# Patient Record
Sex: FEMALE | Race: WHITE | URBAN - METROPOLITAN AREA
[De-identification: names, ages, dates, MRNs, and addresses within clinical notes are randomized per-mention and may not be internally consistent; named-entity substitution may affect disease eponyms.]

---

## 2022-03-31 ENCOUNTER — APPOINTMENT (RX ONLY)
Dept: URBAN - METROPOLITAN AREA CLINIC 49 | Facility: CLINIC | Age: 62
Setting detail: DERMATOLOGY
End: 2022-03-31

## 2022-03-31 DIAGNOSIS — L72.0 EPIDERMAL CYST: ICD-10-CM | Status: STABLE

## 2022-03-31 DIAGNOSIS — D485 NEOPLASM OF UNCERTAIN BEHAVIOR OF SKIN: ICD-10-CM

## 2022-03-31 PROBLEM — D48.5 NEOPLASM OF UNCERTAIN BEHAVIOR OF SKIN: Status: ACTIVE | Noted: 2022-03-31

## 2022-03-31 PROCEDURE — ? COUNSELING

## 2022-03-31 PROCEDURE — ? BIOPSY BY SHAVE METHOD

## 2022-03-31 PROCEDURE — 11103 TANGNTL BX SKIN EA SEP/ADDL: CPT

## 2022-03-31 PROCEDURE — 99202 OFFICE O/P NEW SF 15 MIN: CPT | Mod: 25

## 2022-03-31 PROCEDURE — 11102 TANGNTL BX SKIN SINGLE LES: CPT

## 2022-03-31 ASSESSMENT — LOCATION SIMPLE DESCRIPTION DERM
LOCATION SIMPLE: NOSE
LOCATION SIMPLE: RIGHT CHEEK

## 2022-03-31 ASSESSMENT — LOCATION ZONE DERM
LOCATION ZONE: FACE
LOCATION ZONE: NOSE

## 2022-03-31 ASSESSMENT — LOCATION DETAILED DESCRIPTION DERM
LOCATION DETAILED: COLUMELLA
LOCATION DETAILED: RIGHT CENTRAL MALAR CHEEK
LOCATION DETAILED: NASAL ROOT

## 2022-03-31 NOTE — PROCEDURE: BIOPSY BY SHAVE METHOD
Detail Level: Detailed
Depth Of Biopsy: dermis
Was A Bandage Applied: Yes
Size Of Lesion In Cm: 0
Anticipated Plan (Based On Presumed Biopsy Results): Mohs if positive
Biopsy Type: H and E
Biopsy Method: Personna blade
Anesthesia Type: 1% lidocaine without epinephrine
Anesthesia Volume In Cc (Will Not Render If 0): 2
Hemostasis: Aluminum Chloride and Electrocautery
Wound Care: Vaseline
Dressing: bandage
Destruction After The Procedure: No
Type Of Destruction Used: Curettage
Curettage Text: The wound bed was treated with curettage after the biopsy was performed.
Cryotherapy Text: The wound bed was treated with cryotherapy after the biopsy was performed.
Electrodesiccation Text: The wound bed was treated with electrodesiccation after the biopsy was performed.
Electrodesiccation And Curettage Text: The wound bed was treated with electrodesiccation and curettage after the biopsy was performed.
Silver Nitrate Text: The wound bed was treated with silver nitrate after the biopsy was performed.
Lab: 6
Consent: Written consent was obtained and risks were reviewed including but not limited to scarring, infection, bleeding, scabbing, incomplete removal, nerve damage and allergy to anesthesia.
Post-Care Instructions: I reviewed with the patient in detail post-care instructions. Patient is to keep the biopsy site dry overnight, and then apply bacitracin twice daily until healed.
Notification Instructions: Patient will be notified of biopsy results. However, patient instructed to call the office if not contacted within 2 weeks.
Billing Type: Third-Party Bill
Information: Selecting Yes will display possible errors in your note based on the variables you have selected. This validation is only offered as a suggestion for you. PLEASE NOTE THAT THE VALIDATION TEXT WILL BE REMOVED WHEN YOU FINALIZE YOUR NOTE. IF YOU WANT TO FAX A PRELIMINARY NOTE YOU WILL NEED TO TOGGLE THIS TO 'NO' IF YOU DO NOT WANT IT IN YOUR FAXED NOTE.
Anesthesia Volume In Cc (Will Not Render If 0): 1
Hemostasis: Electrocautery

## 2022-04-20 ENCOUNTER — APPOINTMENT (RX ONLY)
Dept: URBAN - METROPOLITAN AREA CLINIC 49 | Facility: CLINIC | Age: 62
Setting detail: DERMATOLOGY
End: 2022-04-20

## 2022-04-20 PROBLEM — C44.311 BASAL CELL CARCINOMA OF SKIN OF NOSE: Status: ACTIVE | Noted: 2022-04-20

## 2022-04-20 PROCEDURE — 99203 OFFICE O/P NEW LOW 30 MIN: CPT

## 2022-04-20 PROCEDURE — ? CONSULTATION FOR SURGERY

## 2022-04-20 NOTE — HPI: MOHS SURGERY CONSULTATION
Has The Cancer Been Biopsied Before?: has been previously biopsied
When Was Your Biopsy?: 03/21/22
Accession (Optional): [id number]
Who Is Your Referring Provider?: Tyler gutierrez

## 2022-04-20 NOTE — PROCEDURE: CONSULTATION FOR SURGERY
Other Plan: refer to radiation [facility] cancer specialist Other Plan: refer to radiation [facility] cancer specialist

## 2022-04-27 ENCOUNTER — APPOINTMENT (RX ONLY)
Dept: URBAN - METROPOLITAN AREA CLINIC 49 | Facility: CLINIC | Age: 62
Setting detail: DERMATOLOGY
End: 2022-04-27

## 2022-04-27 PROBLEM — C44.311 BASAL CELL CARCINOMA OF SKIN OF NOSE: Status: ACTIVE | Noted: 2022-04-27

## 2022-04-27 PROCEDURE — ? CONSULTATION FOR SURGERY

## 2022-04-27 PROCEDURE — 99213 OFFICE O/P EST LOW 20 MIN: CPT

## 2022-04-27 NOTE — PROCEDURE: CONSULTATION FOR SURGERY
Detail Level: Detailed
Size Of Lesion: 1.2
X Size Of Lesion In Cm (Optional): 0.8
Incorporate Mauc In Note: Yes
Size Of Lesion: 0.9
X Size Of Lesion In Cm (Optional): 0.6

## 2022-05-25 ENCOUNTER — APPOINTMENT (RX ONLY)
Dept: URBAN - METROPOLITAN AREA CLINIC 49 | Facility: CLINIC | Age: 62
Setting detail: DERMATOLOGY
End: 2022-05-25

## 2022-05-25 PROBLEM — C44.311 BASAL CELL CARCINOMA OF SKIN OF NOSE: Status: ACTIVE | Noted: 2022-05-25

## 2022-05-25 PROCEDURE — ? REPAIR NOTE

## 2022-05-25 PROCEDURE — 17311 MOHS 1 STAGE H/N/HF/G: CPT

## 2022-05-25 PROCEDURE — 14060 TIS TRNFR E/N/E/L 10 SQ CM/<: CPT

## 2022-05-25 PROCEDURE — ? MOHS SURGERY

## 2022-05-25 NOTE — PROCEDURE: REPAIR NOTE
Unique Flap 2 Text: The INSERT NAME OF MUSCLE muscle was meticulously dissected and approximated with the least amount of tension. The subcutaneous tissue was approximated followed by dermis. Dan points were approximated first followed by epidermal closure. Dressing was applied.

## 2022-06-01 ENCOUNTER — APPOINTMENT (RX ONLY)
Dept: URBAN - METROPOLITAN AREA CLINIC 49 | Facility: CLINIC | Age: 62
Setting detail: DERMATOLOGY
End: 2022-06-01

## 2022-06-01 PROBLEM — C44.311 BASAL CELL CARCINOMA OF SKIN OF NOSE: Status: ACTIVE | Noted: 2022-06-01

## 2022-06-01 PROCEDURE — 14060 TIS TRNFR E/N/E/L 10 SQ CM/<: CPT | Mod: 79

## 2022-06-01 PROCEDURE — ? REPAIR NOTE

## 2022-06-01 PROCEDURE — 17311 MOHS 1 STAGE H/N/HF/G: CPT | Mod: 79

## 2022-06-01 PROCEDURE — ? MOHS SURGERY

## 2022-06-01 NOTE — PROCEDURE: MOHS SURGERY
LUCINA BSO Unna Boot Text: An Unna boot was placed to help immobilize the limb and facilitate more rapid healing.

## 2022-06-15 ENCOUNTER — APPOINTMENT (RX ONLY)
Dept: URBAN - METROPOLITAN AREA CLINIC 49 | Facility: CLINIC | Age: 62
Setting detail: DERMATOLOGY
End: 2022-06-15

## 2022-06-15 DIAGNOSIS — Z48.02 ENCOUNTER FOR REMOVAL OF SUTURES: ICD-10-CM

## 2022-06-15 PROCEDURE — ? SUTURE REMOVAL (GLOBAL PERIOD)

## 2022-06-15 ASSESSMENT — LOCATION DETAILED DESCRIPTION DERM: LOCATION DETAILED: COLUMELLA

## 2022-06-15 ASSESSMENT — LOCATION ZONE DERM: LOCATION ZONE: NOSE

## 2022-06-15 ASSESSMENT — LOCATION SIMPLE DESCRIPTION DERM: LOCATION SIMPLE: NOSE

## 2022-06-15 NOTE — PROCEDURE: SUTURE REMOVAL (GLOBAL PERIOD)
Detail Level: Detailed
Add 34452 Cpt? (Important Note: In 2017 The Use Of 38833 Is Being Tracked By Cms To Determine Future Global Period Reimbursement For Global Periods): no

## 2022-06-22 ENCOUNTER — APPOINTMENT (RX ONLY)
Dept: URBAN - METROPOLITAN AREA CLINIC 51 | Facility: CLINIC | Age: 62
Setting detail: DERMATOLOGY
End: 2022-06-22

## 2022-06-22 DIAGNOSIS — Z48.02 ENCOUNTER FOR REMOVAL OF SUTURES: ICD-10-CM

## 2022-06-22 PROCEDURE — ? SUTURE REMOVAL (GLOBAL PERIOD)

## 2022-06-22 ASSESSMENT — LOCATION ZONE DERM: LOCATION ZONE: NOSE

## 2022-06-22 ASSESSMENT — LOCATION DETAILED DESCRIPTION DERM: LOCATION DETAILED: COLUMELLA

## 2022-06-22 ASSESSMENT — LOCATION SIMPLE DESCRIPTION DERM: LOCATION SIMPLE: NOSE

## 2022-06-22 NOTE — PROCEDURE: SUTURE REMOVAL (GLOBAL PERIOD)
Detail Level: Detailed
Add 66593 Cpt? (Important Note: In 2017 The Use Of 70309 Is Being Tracked By Cms To Determine Future Global Period Reimbursement For Global Periods): no

## 2022-07-06 ENCOUNTER — APPOINTMENT (RX ONLY)
Dept: URBAN - METROPOLITAN AREA CLINIC 49 | Facility: CLINIC | Age: 62
Setting detail: DERMATOLOGY
End: 2022-07-06

## 2022-07-06 DIAGNOSIS — Z48.817 ENCOUNTER FOR SURGICAL AFTERCARE FOLLOWING SURGERY ON THE SKIN AND SUBCUTANEOUS TISSUE: ICD-10-CM

## 2022-07-06 PROCEDURE — ? POST-OP WOUND CHECK

## 2022-07-06 ASSESSMENT — LOCATION DETAILED DESCRIPTION DERM: LOCATION DETAILED: COLUMELLA

## 2022-07-06 ASSESSMENT — LOCATION SIMPLE DESCRIPTION DERM: LOCATION SIMPLE: NOSE

## 2022-07-06 ASSESSMENT — LOCATION ZONE DERM: LOCATION ZONE: NOSE

## 2022-07-06 NOTE — PROCEDURE: POST-OP WOUND CHECK
Detail Level: Detailed
Add 25840 Cpt? (Important Note: In 2017 The Use Of 63267 Is Being Tracked By Cms To Determine Future Global Period Reimbursement For Global Periods): no

## 2022-08-03 ENCOUNTER — APPOINTMENT (RX ONLY)
Dept: URBAN - METROPOLITAN AREA CLINIC 49 | Facility: CLINIC | Age: 62
Setting detail: DERMATOLOGY
End: 2022-08-03

## 2022-08-03 DIAGNOSIS — Z48.817 ENCOUNTER FOR SURGICAL AFTERCARE FOLLOWING SURGERY ON THE SKIN AND SUBCUTANEOUS TISSUE: ICD-10-CM

## 2022-08-03 PROCEDURE — 99024 POSTOP FOLLOW-UP VISIT: CPT

## 2022-08-03 PROCEDURE — ? POST-OP WOUND CHECK

## 2022-08-03 ASSESSMENT — LOCATION SIMPLE DESCRIPTION DERM: LOCATION SIMPLE: NOSE

## 2022-08-03 ASSESSMENT — LOCATION ZONE DERM: LOCATION ZONE: NOSE

## 2022-08-03 ASSESSMENT — LOCATION DETAILED DESCRIPTION DERM: LOCATION DETAILED: COLUMELLA

## 2022-08-03 NOTE — PROCEDURE: POST-OP WOUND CHECK
Detail Level: Detailed
Add 71825 Cpt? (Important Note: In 2017 The Use Of 71270 Is Being Tracked By Cms To Determine Future Global Period Reimbursement For Global Periods): yes
Additional Comments: Wound healing well. Pt encouraged to continue to massage

## 2023-04-28 NOTE — PROCEDURE: MOHS SURGERY
room air Skin Substitute Text: The defect edges were debeveled with a #15 scalpel blade.  Given the location of the defect, shape of the defect and the proximity to free margins a skin substitute graft was deemed most appropriate.  The graft material was trimmed to fit the size of the defect. The graft was then placed in the primary defect and oriented appropriately.

## 2024-02-15 NOTE — PROCEDURE: REPAIR NOTE
02/16/24      Edmundo Armstrongn  [id number]  [address]  [id number]       ***    Sincerely,         Nallely Clark MA  [facility] [address] [facility]  [address] [facility] [address]  Phone: [phone]  Fax: [phone] Unna Boot Text: An Unna boot was placed to help immobilize the limb and facilitate more rapid healing.